# Patient Record
Sex: MALE | Race: BLACK OR AFRICAN AMERICAN | Employment: FULL TIME | URBAN - METROPOLITAN AREA
[De-identification: names, ages, dates, MRNs, and addresses within clinical notes are randomized per-mention and may not be internally consistent; named-entity substitution may affect disease eponyms.]

---

## 2017-06-05 ENCOUNTER — HOSPITAL ENCOUNTER (OUTPATIENT)
Age: 67
Discharge: HOME | End: 2017-06-05
Attending: FAMILY MEDICINE
Payer: COMMERCIAL

## 2017-06-05 VITALS
TEMPERATURE: 98 F | DIASTOLIC BLOOD PRESSURE: 86 MMHG | HEART RATE: 81 BPM | WEIGHT: 232 LBS | RESPIRATION RATE: 18 BRPM | OXYGEN SATURATION: 99 % | SYSTOLIC BLOOD PRESSURE: 147 MMHG

## 2017-06-05 DIAGNOSIS — J01.10 ACUTE NON-RECURRENT FRONTAL SINUSITIS: Primary | ICD-10-CM

## 2017-06-05 PROCEDURE — 87430 STREP A AG IA: CPT

## 2017-06-05 PROCEDURE — 99204 OFFICE O/P NEW MOD 45 MIN: CPT

## 2017-06-05 PROCEDURE — 99203 OFFICE O/P NEW LOW 30 MIN: CPT

## 2017-06-05 RX ORDER — CEFDINIR 300 MG/1
300 CAPSULE ORAL 2 TIMES DAILY
Qty: 14 CAPSULE | Refills: 0 | Status: SHIPPED | OUTPATIENT
Start: 2017-06-05 | End: 2017-06-12

## 2017-06-05 NOTE — ED PROVIDER NOTES
Patient Seen in: [address]    History   Patient presents with:  Cough/URI    Stated Complaint: cold     HPI      HISTORY OF PRESENT ILLNESS:Patient complains of URI symptoms for 3 days.   Complains of sinus congestion, r bilateral, no resp distress  [place]: rr, no murmurs    DIFFERENTIAL DIAGNOSIS: After history and physical exam differential diagnosis was considered for sinusitis, infectious vs. allergic rhinitis, vs. viral URI       ED Course     Labs Reviewed   [facility]

## (undated) NOTE — ED AVS SNAPSHOT
[facility] in [place] [id number]    Phone:  [phone]           Khloe Lepe   MRN: [id number]    Department:  [facility]   Date of Visit:  6/5/2017           [place] not participate in your health insurance plan. This may result in a lower benefit level being available to you or other limited reimbursement.   The physician may seek payment directly from you for amounts other than your deductible, co-payment, or co-insu prescription right away and begin taking the medication(s) as directed.   If you believe that any of the medications or instructions on this list is different from what your Primary Care doctor has instructed you - please continue to take your medications a - If you don’t have insurance, Ian Martinez has partnered with [facility] to help you get signed up for insurance coverage.   [facility] is a Federal Navigator program that can help with your Affordable Care Act cover